# Patient Record
Sex: FEMALE | Race: BLACK OR AFRICAN AMERICAN | NOT HISPANIC OR LATINO | Employment: OTHER | URBAN - METROPOLITAN AREA
[De-identification: names, ages, dates, MRNs, and addresses within clinical notes are randomized per-mention and may not be internally consistent; named-entity substitution may affect disease eponyms.]

---

## 2017-05-11 DIAGNOSIS — H90.3 SENSORY HEARING LOSS, BILATERAL: Primary | ICD-10-CM

## 2017-05-19 ENCOUNTER — HOSPITAL ENCOUNTER (OUTPATIENT)
Dept: RADIOLOGY | Facility: HOSPITAL | Age: 57
Discharge: HOME | End: 2017-05-19
Attending: OTOLARYNGOLOGY
Payer: MEDICARE

## 2017-05-19 DIAGNOSIS — H90.3 SENSORY HEARING LOSS, BILATERAL: ICD-10-CM

## 2017-05-19 PROCEDURE — 93880 EXTRACRANIAL BILAT STUDY: CPT | Mod: 26,,, | Performed by: RADIOLOGY

## 2017-05-19 PROCEDURE — 93880 EXTRACRANIAL BILAT STUDY: CPT | Mod: TC

## 2018-09-20 ENCOUNTER — OFFICE VISIT (OUTPATIENT)
Dept: OTOLARYNGOLOGY | Facility: CLINIC | Age: 58
End: 2018-09-20
Payer: MEDICARE

## 2018-09-20 VITALS
DIASTOLIC BLOOD PRESSURE: 70 MMHG | SYSTOLIC BLOOD PRESSURE: 138 MMHG | BODY MASS INDEX: 29.41 KG/M2 | HEIGHT: 66 IN | WEIGHT: 183 LBS

## 2018-09-20 DIAGNOSIS — H92.03 REFERRED OTALGIA OF BOTH EARS: ICD-10-CM

## 2018-09-20 DIAGNOSIS — H93.13 TINNITUS OF BOTH EARS: ICD-10-CM

## 2018-09-20 DIAGNOSIS — H90.3 SENSORINEURAL HEARING LOSS (SNHL) OF BOTH EARS: ICD-10-CM

## 2018-09-20 DIAGNOSIS — M26.609 TMJ (TEMPOROMANDIBULAR JOINT SYNDROME): Primary | ICD-10-CM

## 2018-09-20 PROCEDURE — 99214 OFFICE O/P EST MOD 30 MIN: CPT | Mod: S$GLB,,, | Performed by: OTOLARYNGOLOGY

## 2018-09-20 PROCEDURE — 3008F BODY MASS INDEX DOCD: CPT | Mod: CPTII,S$GLB,, | Performed by: OTOLARYNGOLOGY

## 2018-09-20 RX ORDER — IBUPROFEN 600 MG/1
600 TABLET ORAL EVERY 8 HOURS PRN
Qty: 20 TABLET | Refills: 0 | Status: SHIPPED | OUTPATIENT
Start: 2018-09-20

## 2018-09-20 RX ORDER — AMLODIPINE BESYLATE 5 MG/1
10 TABLET ORAL
COMMUNITY
Start: 2015-01-28 | End: 2019-11-07

## 2018-09-20 RX ORDER — CALCIUM CARBONATE 600 MG
600 TABLET ORAL
COMMUNITY

## 2018-09-20 RX ORDER — LEVOCETIRIZINE DIHYDROCHLORIDE 5 MG/1
5 TABLET, FILM COATED ORAL NIGHTLY
Refills: 6 | COMMUNITY
Start: 2018-09-05

## 2018-09-20 RX ORDER — LEVOTHYROXINE SODIUM 25 UG/1
75 TABLET ORAL
Refills: 3 | COMMUNITY
Start: 2018-08-30

## 2018-09-20 NOTE — PATIENT INSTRUCTIONS
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  I prescribed anti-inflammatory medicine for you to take when this happens.  Be careful with your stomach while on this medication.  Be sure you do not clench are grind your teeth or chew gum.    You should come in once a year with a hearing evaluation.

## 2018-09-20 NOTE — PROGRESS NOTES
History of Present Illness:  Day Hernandez presents to the clinic today for Dizziness (off and on for wks) and Tinnitus (Bilateral Ear Pain)  .  She is here today for chronic intermittent and recently acute, sharp bilateral ear pains.  She has had no drainage and has no complaints of additional loss of hearing.  She has a long history of ringing in her ears and sensorineural hearing loss.  She says that the dizziness has resolved.  She recently saw her PCP and was treated with amoxicillin and a Medrol Dosepak.  While this helped, it did not solve the problem.      Review of Systems   Ears: Positive for hearing loss, ear pain, ringing in ear and dizziness.  Negative for ear pressure, ear discharge and ear infections.        Physical Exam:    Constitutional:   She appears well-developed and well-nourished. She is active.     Head:  Normocephalic. Salivary glands normal.  Facial strength is normal.      Ears:  Hearing normal to normal and whispered voice; external ear normal without scars, lesions, or masses; ear canal, tympanic membrane, and middle ear normal..   Examination of her ear canals revealed them to be clear bilaterally. Her tympanic membranes were clear.  There was no bulging or retraction or inflammation in the ears.  Physical examination of her temporomandibular joint revealed tenderness when she opens and closes her mouth with finger pressure in the joint.    Nose:  Nose normal including turbinates, nasal mucosa, sinuses and nasal septum.     Mouth/Throat  Lips, teeth, and gums normal and oropharynx normal.     Neck:  Neck normal without thyromegaly masses, asymmetry, normal tracheal structure, crepitus, and tenderness, thyroid normal, trachea normal and no adenopathy.          Assessment:   Day was seen today for dizziness and tinnitus.    Diagnoses and all orders for this visit:    TMJ (temporomandibular joint syndrome)  -     ibuprofen (ADVIL,MOTRIN) 600 MG tablet; Take 1 tablet (600 mg total) by  mouth every 8 (eight) hours as needed for Pain. Stomach caution.  Do not take with aspirin or other blood thinner medications.    Referred otalgia of both ears  -     ibuprofen (ADVIL,MOTRIN) 600 MG tablet; Take 1 tablet (600 mg total) by mouth every 8 (eight) hours as needed for Pain. Stomach caution.  Do not take with aspirin or other blood thinner medications.    Tinnitus of both ears  -     ibuprofen (ADVIL,MOTRIN) 600 MG tablet; Take 1 tablet (600 mg total) by mouth every 8 (eight) hours as needed for Pain. Stomach caution.  Do not take with aspirin or other blood thinner medications.  -     Ambulatory referral to Audiology  -     COMPREHENSIVE AUDIOGRAM; Future    Sensorineural hearing loss (SNHL) of both ears  -     ibuprofen (ADVIL,MOTRIN) 600 MG tablet; Take 1 tablet (600 mg total) by mouth every 8 (eight) hours as needed for Pain. Stomach caution.  Do not take with aspirin or other blood thinner medications.  -     Ambulatory referral to Audiology  -     COMPREHENSIVE AUDIOGRAM; Future     Her vertigo has presently resolved.      Plan:   I explained temporomandibular joint anatomy to her using a skull for example.  I explained anti inflammatory medication to her and gave her a prescription for Motrin 600 mg to be taken as needed only.  I cautioned her regarding Motrin and her stomach.  I instructed her to stop chewing gum and to be careful that she does not clean sure grind her teeth.  She is missing several teeth.  I advised her to see her dentist for a bite splint if she clenches or grinds her teeth during the night.  I referred her to audiology for an audiogram with possible of hearing aids.    Follow-up in about 1 year (around 9/20/2019) for Hearing evaluation.

## 2018-09-27 ENCOUNTER — CLINICAL SUPPORT (OUTPATIENT)
Dept: AUDIOLOGY | Facility: CLINIC | Age: 58
End: 2018-09-27
Payer: MEDICARE

## 2018-09-27 DIAGNOSIS — H90.3 SENSORINEURAL HEARING LOSS (SNHL) OF BOTH EARS: ICD-10-CM

## 2018-09-27 DIAGNOSIS — H93.13 TINNITUS OF BOTH EARS: ICD-10-CM

## 2018-09-27 PROCEDURE — 92550 TYMPANOMETRY & REFLEX THRESH: CPT | Mod: S$GLB,,, | Performed by: AUDIOLOGIST

## 2018-09-27 PROCEDURE — 92557 COMPREHENSIVE HEARING TEST: CPT | Mod: S$GLB,,, | Performed by: AUDIOLOGIST

## 2018-09-27 NOTE — PROGRESS NOTES
Reason for visit:  Pt seen today for a hearing test following and ENT evaluation.  Pt presented with bilateral intermittent tinnitus.  She reported no hearing difficulty and denies balance problem.    Otoscopy:  Normal   Tymps:  Type A, AU  Acoustic Reflexes:  Present at expected levels, AU  Audio Summary  AD:  Normal sloping to severe SNHL      AS:  Normal sloping to severe SNHL  Recommendations:  Discussed testing results and tinnitus at length.  Hearing aids for tinnitus treatment was discussed.  At this time pt chose no treatment.  Annual hearing testing recommended.

## 2019-03-28 ENCOUNTER — TELEPHONE (OUTPATIENT)
Dept: OTOLARYNGOLOGY | Facility: CLINIC | Age: 59
End: 2019-03-28

## 2019-03-28 NOTE — TELEPHONE ENCOUNTER
----- Message from Ivory Parks sent at 3/28/2019 11:21 AM CDT -----  Contact: Karrie- [facility]   Type:  Sooner Appointment Request    Patient is requesting a sooner appointment.  Patient declined first available appointment listed as well as another facility and provider .  Patient will not accept being placed on the waitlist and is requesting a message be sent to doctor.    Name of Caller: Karrie     When is the first available appointment? Nothing is available    Symptoms: ear pain     Would the patient rather a call back or a response via My [facility]? Call     Best Call Back Number: [phone]    Additional Information: [facility]rie with [facility] says they were looking to get the patient seen with in the next 2 weeks if not the doctor was going to call the patient in antibiotics.

## 2019-10-03 ENCOUNTER — OFFICE VISIT (OUTPATIENT)
Dept: OTOLARYNGOLOGY | Facility: CLINIC | Age: 59
End: 2019-10-03
Payer: MEDICARE

## 2019-10-03 VITALS
BODY MASS INDEX: 30.76 KG/M2 | DIASTOLIC BLOOD PRESSURE: 84 MMHG | SYSTOLIC BLOOD PRESSURE: 136 MMHG | WEIGHT: 191.38 LBS | HEIGHT: 66 IN | HEART RATE: 73 BPM

## 2019-10-03 DIAGNOSIS — H69.92 DYSFUNCTION OF LEFT EUSTACHIAN TUBE: Primary | ICD-10-CM

## 2019-10-03 DIAGNOSIS — H92.02 LEFT EAR PAIN: ICD-10-CM

## 2019-10-03 PROCEDURE — 3008F BODY MASS INDEX DOCD: CPT | Mod: CPTII,S$GLB,, | Performed by: NURSE PRACTITIONER

## 2019-10-03 PROCEDURE — 99999 PR PBB SHADOW E&M-EST. PATIENT-LVL III: ICD-10-PCS | Mod: PBBFAC,,, | Performed by: NURSE PRACTITIONER

## 2019-10-03 PROCEDURE — 3008F PR BODY MASS INDEX (BMI) DOCUMENTED: ICD-10-PCS | Mod: CPTII,S$GLB,, | Performed by: NURSE PRACTITIONER

## 2019-10-03 PROCEDURE — 99999 PR PBB SHADOW E&M-EST. PATIENT-LVL III: CPT | Mod: PBBFAC,,, | Performed by: NURSE PRACTITIONER

## 2019-10-03 PROCEDURE — 99214 PR OFFICE/OUTPT VISIT, EST, LEVL IV, 30-39 MIN: ICD-10-PCS | Mod: S$GLB,,, | Performed by: NURSE PRACTITIONER

## 2019-10-03 PROCEDURE — 99214 OFFICE O/P EST MOD 30 MIN: CPT | Mod: S$GLB,,, | Performed by: NURSE PRACTITIONER

## 2019-10-03 RX ORDER — FLUTICASONE PROPIONATE 50 MCG
2 SPRAY, SUSPENSION (ML) NASAL DAILY
Qty: 1 BOTTLE | Refills: 5 | Status: SHIPPED | OUTPATIENT
Start: 2019-10-03 | End: 2019-11-02

## 2019-10-03 NOTE — PROGRESS NOTES
Subjective:      Day Hernandez is a 59 y.o. female who was referred to me by Reuben Liriano Jr. in consultation for ear pain.    Ms. Hernandez presents with left ear pain for the past several years. She states she has seen several physicians for her ear pain but has not been able to find cause for the pain. She has tried ear drops without benefit. She  There is not a family history of hearing loss at a young age.  There is not a prior history of ear surgery.  There is not a prior history of ear infections .  She denies a history of significant noise exposure.  She does not wear hearing aids currently.  She has not had a hearing test recently.  ETDQ score 6.4 today.      Past Medical History  She has a past medical history of Arthritis, Hypertension, and Thyroid disease.    Past Surgical History  She has a past surgical history that includes Hysterectomy; Tonsillectomy; Colonoscopy; and Breast biopsy (Right).    Family History  Her family history includes Bladder Cancer in her father; Breast cancer in her maternal grandmother and sister.    Social History  She reports that she has never smoked. She has never used smokeless tobacco. She reports that she does not drink alcohol or use drugs.    Allergies  She is allergic to bactrim [sulfamethoxazole-trimethoprim].    Medications  She has a current medication list which includes the following prescription(s): amlodipine, calcium carbonate, ibuprofen, levocetirizine, levothyroxine, ranitidine, and fluticasone propionate.    Review of Systems   Constitutional: Negative for chills, fever and unexpected weight change.   HENT: Positive for congestion, ear pain and hearing loss. Negative for ear discharge, facial swelling, postnasal drip, sinus pressure, sore throat, tinnitus and trouble swallowing.    Eyes: Negative for pain and visual disturbance.   Respiratory: Negative for apnea and shortness of breath.    Cardiovascular: Negative for chest pain and palpitations.  "  Gastrointestinal: Negative for abdominal pain and nausea.   Endocrine: Negative for cold intolerance and heat intolerance.   Musculoskeletal: Negative for joint swelling and neck stiffness.   Skin: Negative for color change and rash.   Neurological: Negative for dizziness, facial asymmetry and headaches.   Hematological: Negative for adenopathy. Does not bruise/bleed easily.   Psychiatric/Behavioral: Negative for agitation. The patient is not nervous/anxious.           Objective:     /84   Pulse 73   Ht 5' 6" (1.676 m)   Wt 86.8 kg (191 lb 5.8 oz)   BMI 30.89 kg/m²      Constitutional:   Vital signs are normal. She appears well-developed and well-nourished.     Head:  Normocephalic and atraumatic.     Ears:    Right Ear: No lacerations. No drainage, swelling or tenderness. No foreign bodies. No mastoid tenderness. Tympanic membrane is not injected, not scarred, not perforated, not erythematous, not retracted and not bulging. Tympanic membrane mobility is normal. No middle ear effusion. No hemotympanum. Decreased hearing is noted.   Left Ear: No lacerations. No drainage, swelling or tenderness. No foreign bodies. No mastoid tenderness. Tympanic membrane is not injected, not scarred, not perforated, not erythematous, not retracted and not bulging. Tympanic membrane mobility is normal.  No middle ear effusion. No hemotympanum. Decreased hearing is noted.     Nose:  Nose normal including turbinates, nasal mucosa, sinuses and nasal septum.     Neck:  Neck normal without thyromegaly masses, asymmetry, normal tracheal structure, crepitus, and tenderness and no adenopathy.     Psychiatric:   She has a normal mood and affect.       Procedure    None    Data Reviewed    No results found for: WBC  No results found for: PLT   No results found for: CREATININE  No results found for: TSH  No results found for: GLU  No results found for: HGBA1C    I independently reviewed the tracings of the complete audiometric " "evaluation performed today.  I reviewed the audiogram with the patient as well.  Pertinent findings include bilateral mild to severe down sloping SNHL. Damened Type A tympanogram. Right SRT 15 with 96% discrimination at 55 db. Left SRT 15 with 92% discrimination at 55 db..         Assessment:     1. Dysfunction of left eustachian tube    2. Left ear pain         Plan:     I had a long discussion with the patient regarding her condition and the further workup and management options.    Her left ear pain that she reports a "deep ear pain" may be attributed to Left ETD vs TMJ. I ordered fluticasone (2 sprays each nostrils once daily for one month). If not improvement, will consider nasal endoscopy for further evaluation and will consider referral to oral surgeon for TMJ related symptoms.    Follow up in about 1 month (around 11/3/2019).  "

## 2019-10-03 NOTE — LETTER
October 3, 2019      Reuben Liriano Jr., NP  [address]  [facility] [address]           [facility] - Otorhinolaryngology  [address]  [facility] [address]  Phone: [phone]  Fax: [phone]          Patient: Day Hernandez   MR Number: [id number]   YOB: 1960   Date of Visit: 10/3/2019       Dear Reuben Liriano Jr.:    Thank you for referring Day Hernandez to me for evaluation. Attached you will find relevant portions of my assessment and plan of care.    If you have questions, please do not hesitate to call me. I look forward to following Day Hernandez along with you.    Sincerely,    Jacob Manzanares, NP    Enclosure  CC:  No Recipients    If you would like to receive this communication electronically, please contact [email or web address] or [phone] to request more information on [employer] access.    For providers and/or their staff who would like to refer a patient to [facility], please contact us through our one-stop-shop provider referral line, [facility], at [phone].    If you feel you have received this communication in error or would no longer like to receive these types of communications, please e-mail [email or web address]

## 2019-11-04 ENCOUNTER — TELEPHONE (OUTPATIENT)
Dept: OTOLARYNGOLOGY | Facility: CLINIC | Age: 59
End: 2019-11-04

## 2019-11-04 ENCOUNTER — OFFICE VISIT (OUTPATIENT)
Dept: OTOLARYNGOLOGY | Facility: CLINIC | Age: 59
End: 2019-11-04
Payer: MEDICARE

## 2019-11-04 ENCOUNTER — HOSPITAL ENCOUNTER (OUTPATIENT)
Dept: RADIOLOGY | Facility: HOSPITAL | Age: 59
Discharge: HOME | End: 2019-11-04
Attending: NURSE PRACTITIONER
Payer: MEDICARE

## 2019-11-04 DIAGNOSIS — H93.12 TINNITUS OF LEFT EAR: Primary | ICD-10-CM

## 2019-11-04 DIAGNOSIS — H93.12 TINNITUS OF LEFT EAR: ICD-10-CM

## 2019-11-04 DIAGNOSIS — H92.02 LEFT EAR PAIN: ICD-10-CM

## 2019-11-04 PROCEDURE — 99999 PR PBB SHADOW E&M-EST. PATIENT-LVL II: CPT | Mod: PBBFAC,,, | Performed by: NURSE PRACTITIONER

## 2019-11-04 PROCEDURE — 70480 CT ORBIT/EAR/FOSSA W/O DYE: CPT | Mod: 26,,, | Performed by: RADIOLOGY

## 2019-11-04 PROCEDURE — 70480 CT TEMPORAL BONE WITHOUT CONTRAST: ICD-10-PCS | Mod: 26,,, | Performed by: RADIOLOGY

## 2019-11-04 PROCEDURE — 99213 OFFICE O/P EST LOW 20 MIN: CPT | Mod: S$GLB,,, | Performed by: NURSE PRACTITIONER

## 2019-11-04 PROCEDURE — 99213 PR OFFICE/OUTPT VISIT, EST, LEVL III, 20-29 MIN: ICD-10-PCS | Mod: S$GLB,,, | Performed by: NURSE PRACTITIONER

## 2019-11-04 PROCEDURE — 99999 PR PBB SHADOW E&M-EST. PATIENT-LVL II: ICD-10-PCS | Mod: PBBFAC,,, | Performed by: NURSE PRACTITIONER

## 2019-11-04 PROCEDURE — 70480 CT ORBIT/EAR/FOSSA W/O DYE: CPT | Mod: TC

## 2019-11-04 NOTE — PROGRESS NOTES
Subjective:      Day is a 59 y.o. female who comes for follow-up of ear pain.  Her last visit with me was on 10/3/2019.  Ms. Hernandez continues with left ear pain. He has tried the fluticasone nasal spray daily without benefit. She states the pain radiates near the back of her ear. She denies ear drainage. She denies change in hearing or dizziness.    HPI (10/3/19):  Day Hernandez is a 59 y.o. female who was referred to me by Reuben Liriano Jr. in consultation for ear pain. Ms. Hernandez presents with left ear pain for the past several years. She states she has seen several physicians for her ear pain but has not been able to find cause for the pain. She has tried ear drops without benefit. There is not a family history of hearing loss at a young age.  There is not a prior history of ear surgery.  There is not a prior history of ear infections .  She denies a history of significant noise exposure.  She does not wear hearing aids currently.  She has not had a hearing test recently.  ETDQ score 6.4 today    The patient's medications, allergies, past medical, surgical, social and family histories were reviewed and updated as appropriate.    A detailed review of systems was obtained with pertinent positives as per the above HPI, and otherwise negative.        Objective:     There were no vitals taken for this visit.       Constitutional:   Vital signs are normal. She appears well-developed and well-nourished.     Head:  Normocephalic and atraumatic.     Ears:    Right Ear: No lacerations. No drainage, swelling or tenderness. No foreign bodies. No mastoid tenderness. Tympanic membrane is not injected, not scarred, not perforated, not erythematous, not retracted and not bulging. Tympanic membrane mobility is normal. No middle ear effusion. No hemotympanum. Decreased hearing is noted.   Left Ear: No lacerations. No drainage, swelling or tenderness. No foreign bodies. No mastoid tenderness. Tympanic membrane is not injected,  not scarred, not perforated, not erythematous, not retracted and not bulging. Tympanic membrane mobility is normal.  No middle ear effusion. No hemotympanum. Decreased hearing is noted.     Nose:  Nose normal including turbinates, nasal mucosa, sinuses and nasal septum.     Neck:  Neck normal without thyromegaly masses, asymmetry, normal tracheal structure, crepitus, and tenderness and no adenopathy.       Procedure    None      Data Reviewed    No results found for: WBC  No results found for: EOSINOPHIL  No results found for: EOS  No results found for: PLT  No results found for: GLU  No results found for: IGE        Assessment:     1. Tinnitus of left ear    2. Left ear pain         Plan:     She has not had any improvement with fluticasone. She continues with a constant left ear pain that she states is the same as before. No indicative factors found of exam that may be contributing to her ear pain.   I ordered a temporal bone CT scan for further evaluation of her chronic left ear pain and left tinnitus.    I will call her with CT results.

## 2019-11-05 ENCOUNTER — TELEPHONE (OUTPATIENT)
Dept: OTOLARYNGOLOGY | Facility: CLINIC | Age: 59
End: 2019-11-05

## 2020-01-07 ENCOUNTER — TELEPHONE (OUTPATIENT)
Dept: OTOLARYNGOLOGY | Facility: CLINIC | Age: 60
End: 2020-01-07

## 2020-07-30 ENCOUNTER — OFFICE VISIT (OUTPATIENT)
Dept: SURGERY | Facility: CLINIC | Age: 60
End: 2020-07-30
Payer: MEDICARE

## 2020-07-30 ENCOUNTER — HOSPITAL ENCOUNTER (OUTPATIENT)
Dept: RADIOLOGY | Facility: HOSPITAL | Age: 60
Discharge: HOME | End: 2020-07-30
Attending: SURGERY
Payer: MEDICARE

## 2020-07-30 VITALS
WEIGHT: 185.94 LBS | BODY MASS INDEX: 29.88 KG/M2 | HEART RATE: 64 BPM | SYSTOLIC BLOOD PRESSURE: 132 MMHG | DIASTOLIC BLOOD PRESSURE: 73 MMHG | HEIGHT: 66 IN

## 2020-07-30 DIAGNOSIS — R92.8 ABNORMAL MAMMOGRAM: Primary | ICD-10-CM

## 2020-07-30 DIAGNOSIS — R92.8 ABNORMAL MAMMOGRAM: ICD-10-CM

## 2020-07-30 PROCEDURE — 3008F BODY MASS INDEX DOCD: CPT | Mod: CPTII,S$GLB,, | Performed by: SURGERY

## 2020-07-30 PROCEDURE — 3008F PR BODY MASS INDEX (BMI) DOCUMENTED: ICD-10-PCS | Mod: CPTII,S$GLB,, | Performed by: SURGERY

## 2020-07-30 PROCEDURE — 99204 PR OFFICE/OUTPT VISIT, NEW, LEVL IV, 45-59 MIN: ICD-10-PCS | Mod: S$GLB,,, | Performed by: SURGERY

## 2020-07-30 PROCEDURE — 99204 OFFICE O/P NEW MOD 45 MIN: CPT | Mod: S$GLB,,, | Performed by: SURGERY

## 2020-07-30 RX ORDER — TRAMADOL HYDROCHLORIDE 50 MG/1
TABLET ORAL
COMMUNITY
Start: 2020-07-16

## 2020-07-30 NOTE — PROGRESS NOTES
Subjective:       Patient ID: Day Hernandez is a 59 y.o. female.    Chief Complaint: Consult and Abnormal Mammogram    HPI 58 yo female with abnormal mammogram and family history of breast cancer here for evaluation  Review of Systems   Constitutional: Negative.    HENT: Negative.    Eyes: Negative.    Respiratory: Negative.    Cardiovascular: Negative.    Gastrointestinal: Negative.    Endocrine: Negative.    Musculoskeletal: Negative.    Integumentary:  Negative.   Allergic/Immunologic: Negative.    Neurological: Negative.    Hematological: Negative.    Psychiatric/Behavioral: Negative.    All other systems reviewed and are negative.        Objective:      Physical Exam  Vitals signs reviewed.   Constitutional:       Appearance: She is well-developed.   HENT:      Head: Normocephalic and atraumatic.      Right Ear: External ear normal.      Left Ear: External ear normal.      Nose: Nose normal.   Eyes:      Conjunctiva/sclera: Conjunctivae normal.      Pupils: Pupils are equal, round, and reactive to light.   Neck:      Musculoskeletal: Normal range of motion and neck supple.   Cardiovascular:      Rate and Rhythm: Normal rate and regular rhythm.      Heart sounds: Normal heart sounds.   Pulmonary:      Effort: Pulmonary effort is normal.      Breath sounds: Normal breath sounds.   Chest:      Breasts:         Right: No swelling, bleeding, inverted nipple, mass, nipple discharge, skin change or tenderness.         Left: No swelling, bleeding, inverted nipple, mass, nipple discharge, skin change or tenderness.   Abdominal:      General: Bowel sounds are normal.      Palpations: Abdomen is soft.   Musculoskeletal: Normal range of motion.   Skin:     General: Skin is warm and dry.   Neurological:      Mental Status: She is alert and oriented to person, place, and time.      Deep Tendon Reflexes: Reflexes are normal and symmetric.   Psychiatric:         Behavior: Behavior normal.         Thought Content: Thought  content normal.         Assessment:       1. Abnormal mammogram        Plan:       I will refer her for stereotactic biopsy and then follow up

## 2020-07-30 NOTE — LETTER
July 30, 2020      Geoff Green MD  [address] [facility] [address]           [facility],  [address], [address]  [facility] [address]  Phone: [phone]  Fax: [phone]          Patient: Day Hernandez   MR Number: [id number]   YOB: 1960   Date of Visit: 7/30/2020       Dear Dr. Geoff Green:    Thank you for referring Day Hernandez to me for evaluation. Attached you will find relevant portions of my assessment and plan of care.    If you have questions, please do not hesitate to call me. I look forward to following Dya Hernandez along with you.    Sincerely,    Emeka Martinez MD    Enclosure  CC:  No Recipients    If you would like to receive this communication electronically, please contact [email or web address] or [phone] to request more information on [facility] access.    For providers and/or their staff who would like to refer a patient to [facility], please contact us through our one-stop-shop provider referral line, [facility] [place], at [phone].    If you feel you have received this communication in error or would no longer like to receive these types of communications, please e-mail [email or web address]

## 2020-07-31 ENCOUNTER — TELEPHONE (OUTPATIENT)
Dept: RADIOLOGY | Facility: HOSPITAL | Age: 60
End: 2020-07-31

## 2020-07-31 NOTE — TELEPHONE ENCOUNTER
Spoke with patient. Reviewed breast biopsy procedure and reviewed instructions for breast biopsy. Patient expressed understanding and all questions were answered. Provided patient with my phone number to call for any further concerns or questions.   Patient scheduled breast biopsy at the [facility] on 8/14/2020.

## 2020-08-06 ENCOUNTER — TELEPHONE (OUTPATIENT)
Dept: RADIOLOGY | Facility: HOSPITAL | Age: 60
End: 2020-08-06

## 2020-08-18 ENCOUNTER — HOSPITAL ENCOUNTER (OUTPATIENT)
Dept: RADIOLOGY | Facility: HOSPITAL | Age: 60
Discharge: HOME | End: 2020-08-18
Attending: SURGERY
Payer: MEDICARE

## 2020-08-18 ENCOUNTER — NURSE TRIAGE (OUTPATIENT)
Dept: ADMINISTRATIVE | Facility: CLINIC | Age: 60
End: 2020-08-18

## 2020-08-18 DIAGNOSIS — R92.8 ABNORMAL MAMMOGRAM: ICD-10-CM

## 2020-08-18 PROCEDURE — 77061 BREAST TOMOSYNTHESIS UNI: CPT | Mod: TC,LT

## 2020-08-18 PROCEDURE — 25000003 PHARM REV CODE 250: Performed by: SURGERY

## 2020-08-18 PROCEDURE — 88305 TISSUE EXAM BY PATHOLOGIST: CPT | Performed by: PATHOLOGY

## 2020-08-18 PROCEDURE — 77065 DX MAMMO INCL CAD UNI: CPT | Mod: TC,LT

## 2020-08-18 PROCEDURE — 88305 TISSUE EXAM BY PATHOLOGIST: CPT | Mod: 26,,, | Performed by: PATHOLOGY

## 2020-08-18 PROCEDURE — 27200939 MAMMO BREAST STEREOTACTIC BREAST BIOPSY LEFT

## 2020-08-18 PROCEDURE — 77065 DX MAMMO INCL CAD UNI: CPT | Mod: 26,LT,, | Performed by: RADIOLOGY

## 2020-08-18 PROCEDURE — 77061 BREAST TOMOSYNTHESIS UNI: CPT | Mod: 26,LT,, | Performed by: RADIOLOGY

## 2020-08-18 PROCEDURE — 77065 MAMMO DIGITAL DIAGNOSTIC LEFT WITH TOMOSYNTHESIS_CAD: ICD-10-PCS | Mod: 26,LT,, | Performed by: RADIOLOGY

## 2020-08-18 PROCEDURE — 19081 MAMMO BREAST STEREOTACTIC BREAST BIOPSY LEFT: ICD-10-PCS | Mod: LT,,, | Performed by: RADIOLOGY

## 2020-08-18 PROCEDURE — 77061 MAMMO DIGITAL DIAGNOSTIC LEFT WITH TOMOSYNTHESIS_CAD: ICD-10-PCS | Mod: 26,LT,, | Performed by: RADIOLOGY

## 2020-08-18 PROCEDURE — 88305 TISSUE EXAM BY PATHOLOGIST: ICD-10-PCS | Mod: 26,,, | Performed by: PATHOLOGY

## 2020-08-18 PROCEDURE — 19081 BX BREAST 1ST LESION STRTCTC: CPT | Mod: LT,,, | Performed by: RADIOLOGY

## 2020-08-18 RX ORDER — LIDOCAINE HYDROCHLORIDE AND EPINEPHRINE 20; 10 MG/ML; UG/ML
20 INJECTION, SOLUTION INFILTRATION; PERINEURAL ONCE
Status: COMPLETED | OUTPATIENT
Start: 2020-08-18 | End: 2020-08-18

## 2020-08-18 RX ORDER — LIDOCAINE HYDROCHLORIDE 10 MG/ML
2 INJECTION, SOLUTION EPIDURAL; INFILTRATION; INTRACAUDAL; PERINEURAL ONCE
Status: COMPLETED | OUTPATIENT
Start: 2020-08-18 | End: 2020-08-18

## 2020-08-18 RX ADMIN — LIDOCAINE HYDROCHLORIDE,EPINEPHRINE BITARTRATE 20 ML: 20; .01 INJECTION, SOLUTION INFILTRATION; PERINEURAL at 03:08

## 2020-08-18 RX ADMIN — LIDOCAINE HYDROCHLORIDE 2 ML: 10 INJECTION, SOLUTION EPIDURAL; INFILTRATION; INTRACAUDAL; PERINEURAL at 03:08

## 2020-08-18 NOTE — TELEPHONE ENCOUNTER
"  Reason for Disposition   Caller has medication question, adult has minor symptoms, caller declines triage, AND triager answers question    Additional Information   Negative: Drug overdose and triager unable to answer question   Negative: Caller requesting information unrelated to medicine   Negative: Caller requesting a prescription for Strep throat and has a positive culture result   Negative: Rash while taking a medication or within 3 days of stopping it   Negative: Immunization reaction suspected   Negative: [1] Asthma and [2] having symptoms of asthma (cough, wheezing, etc.)   Negative: [1] Influenza symptoms AND [2] anti-viral med prescription request, such as Tamiflu   Negative: [1] Symptom of illness (e.g., headache, abdominal pain, earache, vomiting) AND [2] more than mild   Negative: MORE THAN A DOUBLE DOSE of a prescription or over-the-counter (OTC) drug   Negative: [1] DOUBLE DOSE (an extra dose or lesser amount) of over-the-counter (OTC) drug AND [2] any symptoms (e.g., dizziness, nausea, pain, sleepiness)   Negative: [1] DOUBLE DOSE (an extra dose or lesser amount) of prescription drug AND [2] any symptoms (e.g., dizziness, nausea, pain, sleepiness)   Negative: Took another person's prescription drug   Negative: [1] DOUBLE DOSE (an extra dose or lesser amount) of prescription drug AND [2] NO symptoms (Exception: a double dose of antibiotics)   Negative: Diabetes drug error or overdose (e.g., took wrong type of insulin or took extra dose)   Negative: [1] Request for URGENT new prescription or refill of "essential" medication (i.e., likelihood of harm to patient if not taken) AND [2] triager unable to fill per unit policy   Negative: [1] Prescription not at pharmacy AND [2] was prescribed by PCP recently   Negative: [1] Pharmacy calling with prescription questions AND [2] triager unable to answer question   Negative: [1] Caller has URGENT medication question about med that PCP or " specialist prescribed AND [2] triager unable to answer question   Negative: [1] Caller has NON-URGENT medication question about med that PCP prescribed AND [2] triager unable to answer question   Negative: [1] Caller requesting a NON-URGENT new prescription or refill AND [2] triager unable to refill per unit policy   Negative: [1] Caller has medication question about med not prescribed by PCP AND [2] triager unable to answer question (e.g., compatibility with other med, storage)   Negative: Caller requesting a CONTROLLED substance prescription refill (e.g., narcotics, ADHD medicines)   Negative: Caller wants to use a complementary or alternative medicine   Negative: [1] Prescription prescribed recently is not at pharmacy AND [2] triager has access to patient's EMR AND [3] prescription is recorded in the EMR   Negative: [1] DOUBLE DOSE (an extra dose or lesser amount) of over-the-counter (OTC) drug AND [2] NO symptoms   Negative: [1] DOUBLE DOSE (an extra dose or lesser amount) of antibiotic drug AND [2] NO symptoms   Negative: Caller has medication question only, adult not sick, and triager answers question    Protocols used: MEDICATION QUESTION CALL-A-

## 2020-08-18 NOTE — TELEPHONE ENCOUNTER
Pt had biopsy today on L breast, wanting to confirm ok to take tyenol. Pt states listed on DC instructions, wanting to be sure her prescribed tylenol 3 didn't have ASA as she was told not to take.

## 2020-08-19 ENCOUNTER — TELEPHONE (OUTPATIENT)
Dept: SURGERY | Facility: CLINIC | Age: 60
End: 2020-08-19

## 2020-08-19 LAB
FINAL PATHOLOGIC DIAGNOSIS: NORMAL
GROSS: NORMAL

## 2020-08-19 NOTE — TELEPHONE ENCOUNTER
Spoke with pt, informed her that we need to schedule her a f/u appt to receive her bx results. Pt is scheduled for 8/26/2020 @ 1:30pm.      ----- Message from Trupti Forrester sent at 8/19/2020 12:21 PM CDT -----  Contact: Patient [phone]  Type:  Patient Returning Call    Who Called: Patient    Who Left Message for Patient: Pina    Does the patient know what this is regarding?: Missed call    Would the patient rather a call back or a response via My [facility]?  Call back    Best Call Back Number: [phone]    Additional Information: Pt would like to speak to Pina about appointment.

## 2020-08-20 ENCOUNTER — TELEPHONE (OUTPATIENT)
Dept: SURGERY | Facility: CLINIC | Age: 60
End: 2020-08-20

## 2020-08-20 NOTE — TELEPHONE ENCOUNTER
Patient called with results of breast biopsy from 8/18/2020,   no atypia/benign, all questions answered, pt advised to follow up in 6 months with repeat imaging per core biopsy protocol.  reviewed biopsy results and results are benign and concordant. Patient verbalized understanding.

## 2020-08-26 ENCOUNTER — OFFICE VISIT (OUTPATIENT)
Dept: SURGERY | Facility: CLINIC | Age: 60
End: 2020-08-26
Payer: MEDICARE

## 2020-08-26 VITALS
HEART RATE: 67 BPM | BODY MASS INDEX: 29.73 KG/M2 | DIASTOLIC BLOOD PRESSURE: 87 MMHG | HEIGHT: 66 IN | WEIGHT: 185 LBS | SYSTOLIC BLOOD PRESSURE: 131 MMHG

## 2020-08-26 DIAGNOSIS — R92.8 ABNORMAL MAMMOGRAM: Primary | ICD-10-CM

## 2020-08-26 PROCEDURE — 3008F BODY MASS INDEX DOCD: CPT | Mod: CPTII,S$GLB,, | Performed by: SURGERY

## 2020-08-26 PROCEDURE — 3008F PR BODY MASS INDEX (BMI) DOCUMENTED: ICD-10-PCS | Mod: CPTII,S$GLB,, | Performed by: SURGERY

## 2020-08-26 PROCEDURE — 99214 PR OFFICE/OUTPT VISIT, EST, LEVL IV, 30-39 MIN: ICD-10-PCS | Mod: S$GLB,,, | Performed by: SURGERY

## 2020-08-26 PROCEDURE — 99214 OFFICE O/P EST MOD 30 MIN: CPT | Mod: S$GLB,,, | Performed by: SURGERY

## 2020-08-26 RX ORDER — CYCLOBENZAPRINE HCL 5 MG
TABLET ORAL
COMMUNITY
Start: 2020-05-28

## 2020-08-26 RX ORDER — ACETAMINOPHEN AND CODEINE PHOSPHATE 300; 30 MG/1; MG/1
TABLET ORAL
COMMUNITY
Start: 2020-08-12

## 2020-08-26 NOTE — PROGRESS NOTES
Subjective:       Patient ID: Day Hernandez is a 60 y.o. female.    Chief Complaint: Follow-up (f/u w/ bx results )    HPI 59 yo female with abnormal mammogram and biopsy showing benign FBA  Review of Systems   Constitutional: Negative.    HENT: Negative.    Eyes: Negative.    Respiratory: Negative.    Cardiovascular: Negative.    Gastrointestinal: Negative.    Endocrine: Negative.    Musculoskeletal: Negative.    Integumentary:  Negative.   Allergic/Immunologic: Negative.    Neurological: Negative.    Hematological: Negative.    Psychiatric/Behavioral: Negative.    All other systems reviewed and are negative.        Objective:      Physical Exam  Vitals signs reviewed.   Constitutional:       Appearance: She is well-developed.   HENT:      Head: Normocephalic and atraumatic.      Right Ear: External ear normal.      Left Ear: External ear normal.      Nose: Nose normal.   Eyes:      Conjunctiva/sclera: Conjunctivae normal.      Pupils: Pupils are equal, round, and reactive to light.   Neck:      Musculoskeletal: Normal range of motion and neck supple.   Cardiovascular:      Rate and Rhythm: Normal rate and regular rhythm.      Heart sounds: Normal heart sounds.   Pulmonary:      Effort: Pulmonary effort is normal.      Breath sounds: Normal breath sounds.   Abdominal:      General: Bowel sounds are normal.      Palpations: Abdomen is soft.   Musculoskeletal: Normal range of motion.   Skin:     General: Skin is warm and dry.   Neurological:      Mental Status: She is alert and oriented to person, place, and time.      Deep Tendon Reflexes: Reflexes are normal and symmetric.   Psychiatric:         Behavior: Behavior normal.         Thought Content: Thought content normal.         Assessment:       1. Abnormal mammogram      negative biopsy   Plan:       I will see her back in 6 months with repeat mammogram on the left

## 2022-06-07 NOTE — PROGRESS NOTES
Ms. Day Hernandez was seen in the clinic today for an audiological evaluation.  Ms. Hernandez reported bilateral tinnitus.    Audiological testing revealed a mild to moderately-severe sensorineural hearing loss for the right ear and a mild to moderately-severe sensorineural hearing loss for the left ear.  A speech reception threshold was obtained at 20 dBHL for the right ear and at 20 dBHL for the left ear.  Speech discrimination was 96% for the right ear and 92% for the left ear.      Tympanometry testing revealed a Type A tympanogram for the right ear and a Type A tympanogram for the left ear.    Recommendations:  1. Otologic evaluation  2. Annual audiological evaluation  3. Hearing protection when in noise   4. Hearing aid consultation    Please click on link to view Audiogram:  Document on 6/8/2022  3:22 PM by NICOLE QuezadaD: Audiogram

## 2022-06-08 ENCOUNTER — CLINICAL SUPPORT (OUTPATIENT)
Dept: AUDIOLOGY | Facility: CLINIC | Age: 62
End: 2022-06-08
Payer: MEDICARE

## 2022-06-08 DIAGNOSIS — H93.13 TINNITUS OF BOTH EARS: ICD-10-CM

## 2022-06-08 DIAGNOSIS — H90.3 SENSORINEURAL HEARING LOSS OF BOTH EARS: Primary | ICD-10-CM

## 2022-06-08 PROCEDURE — 92557 PR COMPREHENSIVE HEARING TEST: ICD-10-PCS | Mod: S$GLB,,, | Performed by: AUDIOLOGIST

## 2022-06-08 PROCEDURE — 92567 PR TYMPA2METRY: ICD-10-PCS | Mod: S$GLB,,, | Performed by: AUDIOLOGIST

## 2022-06-08 PROCEDURE — 92557 COMPREHENSIVE HEARING TEST: CPT | Mod: S$GLB,,, | Performed by: AUDIOLOGIST

## 2022-06-08 PROCEDURE — 92567 TYMPANOMETRY: CPT | Mod: S$GLB,,, | Performed by: AUDIOLOGIST

## 2022-06-09 ENCOUNTER — OFFICE VISIT (OUTPATIENT)
Dept: OTOLARYNGOLOGY | Facility: CLINIC | Age: 62
End: 2022-06-09
Payer: MEDICARE

## 2022-06-09 VITALS — BODY MASS INDEX: 26.12 KG/M2 | HEIGHT: 66 IN | WEIGHT: 162.5 LBS

## 2022-06-09 DIAGNOSIS — H91.13 PRESBYCUSIS OF BOTH EARS: ICD-10-CM

## 2022-06-09 DIAGNOSIS — H90.3 SENSORINEURAL HEARING LOSS, BILATERAL: Primary | ICD-10-CM

## 2022-06-09 DIAGNOSIS — H93.13 TINNITUS AURIUM, BILATERAL: ICD-10-CM

## 2022-06-09 PROCEDURE — 1159F MED LIST DOCD IN RCRD: CPT | Mod: CPTII,S$GLB,, | Performed by: OTOLARYNGOLOGY

## 2022-06-09 PROCEDURE — 99213 PR OFFICE/OUTPT VISIT, EST, LEVL III, 20-29 MIN: ICD-10-PCS | Mod: S$GLB,,, | Performed by: OTOLARYNGOLOGY

## 2022-06-09 PROCEDURE — 99213 OFFICE O/P EST LOW 20 MIN: CPT | Mod: S$GLB,,, | Performed by: OTOLARYNGOLOGY

## 2022-06-09 PROCEDURE — 3008F PR BODY MASS INDEX (BMI) DOCUMENTED: ICD-10-PCS | Mod: CPTII,S$GLB,, | Performed by: OTOLARYNGOLOGY

## 2022-06-09 PROCEDURE — 3008F BODY MASS INDEX DOCD: CPT | Mod: CPTII,S$GLB,, | Performed by: OTOLARYNGOLOGY

## 2022-06-09 PROCEDURE — 1159F PR MEDICATION LIST DOCUMENTED IN MEDICAL RECORD: ICD-10-PCS | Mod: CPTII,S$GLB,, | Performed by: OTOLARYNGOLOGY

## 2022-06-09 NOTE — PROGRESS NOTES
Subjective:       Patient ID: Day Hernandez is a 61 y.o. female.    Chief Complaint: Hearing Loss (Hears noises in her ears /Both ears/Has been having for years)    Hearing Loss:   Chronicity:  Chronic  Onset:  More than 1 month ago  Progression since onset:  Unchanged  Frequency:  Constantly  Severity:  Mild  Hearing loss characteristics:  Worse background noise and impaired select discrimination   Associated symptoms: tinnitus.  No fever.  Risk factors for lung disease:  Noise exposure    Review of Systems   Constitutional: Negative for chills and fever.   HENT: Positive for hearing loss and tinnitus. Negative for sore throat and trouble swallowing.    Respiratory: Negative for apnea and chest tightness.    Cardiovascular: Negative for chest pain.         Objective:      Physical Exam  Vitals and nursing note reviewed.   Constitutional:       Appearance: Normal appearance.   HENT:      Head: Normocephalic and atraumatic.      Right Ear: Tympanic membrane, ear canal and external ear normal. There is no impacted cerumen.      Left Ear: Tympanic membrane, ear canal and external ear normal. There is no impacted cerumen.   Neurological:      Mental Status: She is alert.         Data Reviewed:     Audiogram tracings independently reviewed and discussed with patient shows sloping mild-severe SNHL     Assessment:       Problem List Items Addressed This Visit    None     Visit Diagnoses     Sensorineural hearing loss, bilateral    -  Primary    Tinnitus aurium, bilateral        Presbycusis of both ears              Plan:         Discussed with patient multiple tinnitus management strategies including the use of maskers, instruments, background sound enrichment and other means. All questions answered and appropriate references given.      Discussed hearing aids  Recheck hearing in 1-3 yrs

## 2024-12-30 ENCOUNTER — HOSPITAL ENCOUNTER (OUTPATIENT)
Dept: RADIOLOGY | Facility: HOSPITAL | Age: 64
Discharge: HOME | End: 2024-12-30
Attending: ORTHOPAEDIC SURGERY
Payer: MEDICARE

## 2024-12-30 DIAGNOSIS — G57.32 ENTRAPMENT OF LEFT DEEP PERONEAL NERVE: ICD-10-CM

## 2024-12-30 PROCEDURE — 76882 US LMTD JT/FCL EVL NVASC XTR: CPT | Mod: 26,LT,, | Performed by: RADIOLOGY

## 2024-12-30 PROCEDURE — 76882 US LMTD JT/FCL EVL NVASC XTR: CPT | Mod: TC,LT

## 2025-06-16 DIAGNOSIS — R63.4 WEIGHT LOSS, UNINTENTIONAL: Primary | ICD-10-CM

## 2025-06-28 ENCOUNTER — HOSPITAL ENCOUNTER (OUTPATIENT)
Dept: RADIOLOGY | Facility: HOSPITAL | Age: 65
Discharge: HOME | End: 2025-06-28
Attending: INTERNAL MEDICINE
Payer: MEDICARE

## 2025-06-28 DIAGNOSIS — R63.4 WEIGHT LOSS, UNINTENTIONAL: ICD-10-CM

## 2025-06-28 PROCEDURE — 74176 CT ABD & PELVIS W/O CONTRAST: CPT | Mod: TC

## 2025-06-28 PROCEDURE — 74176 CT ABD & PELVIS W/O CONTRAST: CPT | Mod: 26,,, | Performed by: RADIOLOGY

## 2025-06-30 ENCOUNTER — HOSPITAL ENCOUNTER (OUTPATIENT)
Dept: RADIOLOGY | Facility: HOSPITAL | Age: 65
Discharge: HOME | End: 2025-06-30
Attending: STUDENT IN AN ORGANIZED HEALTH CARE EDUCATION/TRAINING PROGRAM
Payer: MEDICARE

## 2025-06-30 DIAGNOSIS — Z91.89 AT HIGH RISK FOR BREAST CANCER: ICD-10-CM

## 2025-06-30 DIAGNOSIS — Z80.3 FAMILY HISTORY OF BREAST CANCER: ICD-10-CM

## 2025-06-30 PROCEDURE — A9577 INJ MULTIHANCE: HCPCS | Performed by: STUDENT IN AN ORGANIZED HEALTH CARE EDUCATION/TRAINING PROGRAM

## 2025-06-30 PROCEDURE — 77049 MRI BREAST C-+ W/CAD BI: CPT | Mod: 26,,, | Performed by: RADIOLOGY

## 2025-06-30 PROCEDURE — 25500020 PHARM REV CODE 255: Performed by: STUDENT IN AN ORGANIZED HEALTH CARE EDUCATION/TRAINING PROGRAM

## 2025-06-30 PROCEDURE — C8937 CAD BREAST MRI: HCPCS | Mod: TC

## 2025-06-30 RX ADMIN — GADOBENATE DIMEGLUMINE 16 ML: 529 INJECTION, SOLUTION INTRAVENOUS at 06:06
